# Patient Record
Sex: MALE | Race: ASIAN | NOT HISPANIC OR LATINO | URBAN - METROPOLITAN AREA
[De-identification: names, ages, dates, MRNs, and addresses within clinical notes are randomized per-mention and may not be internally consistent; named-entity substitution may affect disease eponyms.]

---

## 2018-06-26 ENCOUNTER — RECORDS - HEALTHEAST (OUTPATIENT)
Dept: GENERAL RADIOLOGY | Facility: CLINIC | Age: 53
End: 2018-06-26

## 2018-06-26 ENCOUNTER — COMMUNICATION - HEALTHEAST (OUTPATIENT)
Dept: TELEHEALTH | Facility: CLINIC | Age: 53
End: 2018-06-26

## 2018-06-26 ENCOUNTER — OFFICE VISIT - HEALTHEAST (OUTPATIENT)
Dept: FAMILY MEDICINE | Facility: CLINIC | Age: 53
End: 2018-06-26

## 2018-06-26 DIAGNOSIS — M25.50 MULTIPLE JOINT PAIN: ICD-10-CM

## 2018-06-26 DIAGNOSIS — F32.A DEPRESSION: ICD-10-CM

## 2018-06-26 DIAGNOSIS — M25.50 PAIN IN UNSPECIFIED JOINT: ICD-10-CM

## 2018-06-26 LAB
ERYTHROCYTE [SEDIMENTATION RATE] IN BLOOD BY WESTERGREN METHOD: 9 MM/HR (ref 0–15)
RHEUMATOID FACT SERPL-ACNC: <15 IU/ML (ref 0–30)
URATE SERPL-MCNC: 4.7 MG/DL (ref 3–8)

## 2018-06-26 ASSESSMENT — MIFFLIN-ST. JEOR: SCORE: 1290.32

## 2018-06-27 ENCOUNTER — AMBULATORY - HEALTHEAST (OUTPATIENT)
Dept: FAMILY MEDICINE | Facility: CLINIC | Age: 53
End: 2018-06-27

## 2018-06-28 LAB — ANA SER QL: 0.6 U

## 2018-07-10 ENCOUNTER — OFFICE VISIT - HEALTHEAST (OUTPATIENT)
Dept: FAMILY MEDICINE | Facility: CLINIC | Age: 53
End: 2018-07-10

## 2018-07-10 DIAGNOSIS — F32.A DEPRESSION: ICD-10-CM

## 2018-07-10 DIAGNOSIS — M25.562 LEFT KNEE PAIN: ICD-10-CM

## 2018-07-10 DIAGNOSIS — M25.50 MULTIPLE JOINT PAIN: ICD-10-CM

## 2018-07-24 ENCOUNTER — OFFICE VISIT - HEALTHEAST (OUTPATIENT)
Dept: PHYSICAL THERAPY | Facility: REHABILITATION | Age: 53
End: 2018-07-24

## 2018-07-24 DIAGNOSIS — M62.81 MUSCLE WEAKNESS (GENERALIZED): ICD-10-CM

## 2018-07-24 DIAGNOSIS — M54.16 LUMBAR RADICULOPATHY: ICD-10-CM

## 2018-07-24 DIAGNOSIS — M79.605 LEFT LEG PAIN: ICD-10-CM

## 2018-08-07 ENCOUNTER — OFFICE VISIT - HEALTHEAST (OUTPATIENT)
Dept: FAMILY MEDICINE | Facility: CLINIC | Age: 53
End: 2018-08-07

## 2018-08-07 DIAGNOSIS — M25.50 MULTIPLE JOINT PAIN: ICD-10-CM

## 2018-08-07 DIAGNOSIS — F32.A DEPRESSION: ICD-10-CM

## 2018-08-07 RX ORDER — DICLOFENAC SODIUM 75 MG/1
TABLET, DELAYED RELEASE ORAL
Qty: 40 TABLET | Refills: 1 | Status: SHIPPED | OUTPATIENT
Start: 2018-08-07

## 2018-08-09 ENCOUNTER — OFFICE VISIT - HEALTHEAST (OUTPATIENT)
Dept: PHYSICAL THERAPY | Facility: REHABILITATION | Age: 53
End: 2018-08-09

## 2018-08-09 DIAGNOSIS — M54.16 LUMBAR RADICULOPATHY: ICD-10-CM

## 2018-08-09 DIAGNOSIS — M79.605 LEFT LEG PAIN: ICD-10-CM

## 2018-08-09 DIAGNOSIS — M62.81 MUSCLE WEAKNESS (GENERALIZED): ICD-10-CM

## 2018-08-13 ENCOUNTER — AMBULATORY - HEALTHEAST (OUTPATIENT)
Dept: FAMILY MEDICINE | Facility: CLINIC | Age: 53
End: 2018-08-13

## 2018-08-15 ENCOUNTER — OFFICE VISIT - HEALTHEAST (OUTPATIENT)
Dept: PHYSICAL THERAPY | Facility: REHABILITATION | Age: 53
End: 2018-08-15

## 2018-08-15 DIAGNOSIS — M62.81 MUSCLE WEAKNESS (GENERALIZED): ICD-10-CM

## 2018-08-15 DIAGNOSIS — M54.16 LUMBAR RADICULOPATHY: ICD-10-CM

## 2018-08-15 DIAGNOSIS — M79.605 LEFT LEG PAIN: ICD-10-CM

## 2018-08-23 ENCOUNTER — OFFICE VISIT - HEALTHEAST (OUTPATIENT)
Dept: PHYSICAL THERAPY | Facility: REHABILITATION | Age: 53
End: 2018-08-23

## 2018-08-23 DIAGNOSIS — M54.16 LUMBAR RADICULOPATHY: ICD-10-CM

## 2018-08-23 DIAGNOSIS — M79.605 LEFT LEG PAIN: ICD-10-CM

## 2018-08-23 DIAGNOSIS — M62.81 MUSCLE WEAKNESS (GENERALIZED): ICD-10-CM

## 2018-10-04 ENCOUNTER — OFFICE VISIT - HEALTHEAST (OUTPATIENT)
Dept: FAMILY MEDICINE | Facility: CLINIC | Age: 53
End: 2018-10-04

## 2018-10-04 DIAGNOSIS — M77.12 LATERAL EPICONDYLITIS, LEFT ELBOW: ICD-10-CM

## 2018-10-04 DIAGNOSIS — Z12.11 SCREEN FOR COLON CANCER: ICD-10-CM

## 2018-10-08 ENCOUNTER — AMBULATORY - HEALTHEAST (OUTPATIENT)
Dept: FAMILY MEDICINE | Facility: CLINIC | Age: 53
End: 2018-10-08

## 2019-04-02 ENCOUNTER — OFFICE VISIT - HEALTHEAST (OUTPATIENT)
Dept: FAMILY MEDICINE | Facility: CLINIC | Age: 54
End: 2019-04-02

## 2019-04-02 ENCOUNTER — RECORDS - HEALTHEAST (OUTPATIENT)
Dept: GENERAL RADIOLOGY | Facility: CLINIC | Age: 54
End: 2019-04-02

## 2019-04-02 ENCOUNTER — COMMUNICATION - HEALTHEAST (OUTPATIENT)
Dept: FAMILY MEDICINE | Facility: CLINIC | Age: 54
End: 2019-04-02

## 2019-04-02 DIAGNOSIS — M25.50 MULTIPLE JOINT PAIN: ICD-10-CM

## 2019-04-02 DIAGNOSIS — M54.5 LOW BACK PAIN, UNSPECIFIED BACK PAIN LATERALITY, UNSPECIFIED CHRONICITY, WITH SCIATICA PRESENCE UNSPECIFIED: ICD-10-CM

## 2019-04-02 DIAGNOSIS — M54.16 RADICULOPATHY, LUMBAR REGION: ICD-10-CM

## 2019-04-02 DIAGNOSIS — M54.16 LUMBAR RADICULOPATHY: ICD-10-CM

## 2019-04-02 DIAGNOSIS — M54.50 LOW BACK PAIN: ICD-10-CM

## 2019-04-02 DIAGNOSIS — F32.A DEPRESSION: ICD-10-CM

## 2019-04-02 DIAGNOSIS — M77.12 LATERAL EPICONDYLITIS, LEFT ELBOW: ICD-10-CM

## 2019-04-02 LAB
ANION GAP SERPL CALCULATED.3IONS-SCNC: 9 MMOL/L (ref 5–18)
BUN SERPL-MCNC: 9 MG/DL (ref 8–22)
CALCIUM SERPL-MCNC: 9.3 MG/DL (ref 8.5–10.5)
CHLORIDE BLD-SCNC: 106 MMOL/L (ref 98–107)
CO2 SERPL-SCNC: 26 MMOL/L (ref 22–31)
CREAT SERPL-MCNC: 0.85 MG/DL (ref 0.7–1.3)
GFR SERPL CREATININE-BSD FRML MDRD: >60 ML/MIN/1.73M2
GLUCOSE BLD-MCNC: 95 MG/DL (ref 70–125)
POTASSIUM BLD-SCNC: 3.8 MMOL/L (ref 3.5–5)
SODIUM SERPL-SCNC: 141 MMOL/L (ref 136–145)

## 2019-04-02 RX ORDER — PREDNISONE 10 MG/1
TABLET ORAL
Qty: 40 TABLET | Refills: 0 | Status: SHIPPED | OUTPATIENT
Start: 2019-04-02

## 2019-04-02 ASSESSMENT — MIFFLIN-ST. JEOR: SCORE: 1317.53

## 2019-04-03 ENCOUNTER — COMMUNICATION - HEALTHEAST (OUTPATIENT)
Dept: FAMILY MEDICINE | Facility: CLINIC | Age: 54
End: 2019-04-03

## 2020-11-23 ENCOUNTER — RECORDS - HEALTHEAST (OUTPATIENT)
Dept: LAB | Facility: CLINIC | Age: 55
End: 2020-11-23

## 2020-11-23 LAB
ALBUMIN SERPL-MCNC: 4.1 G/DL (ref 3.5–5)
ALP SERPL-CCNC: 112 U/L (ref 45–120)
ALT SERPL W P-5'-P-CCNC: 23 U/L (ref 0–45)
ANION GAP SERPL CALCULATED.3IONS-SCNC: 10 MMOL/L (ref 5–18)
AST SERPL W P-5'-P-CCNC: 22 U/L (ref 0–40)
BILIRUB SERPL-MCNC: 0.9 MG/DL (ref 0–1)
BUN SERPL-MCNC: 12 MG/DL (ref 8–22)
CALCIUM SERPL-MCNC: 9.2 MG/DL (ref 8.5–10.5)
CHLORIDE BLD-SCNC: 104 MMOL/L (ref 98–107)
CHOLEST SERPL-MCNC: 232 MG/DL
CO2 SERPL-SCNC: 28 MMOL/L (ref 22–31)
CREAT SERPL-MCNC: 0.91 MG/DL (ref 0.7–1.3)
FASTING STATUS PATIENT QL REPORTED: ABNORMAL
GFR SERPL CREATININE-BSD FRML MDRD: >60 ML/MIN/1.73M2
GLUCOSE BLD-MCNC: 98 MG/DL (ref 70–125)
HDLC SERPL-MCNC: 49 MG/DL
LDLC SERPL CALC-MCNC: 161 MG/DL
POTASSIUM BLD-SCNC: 3.8 MMOL/L (ref 3.5–5)
PROT SERPL-MCNC: 7.8 G/DL (ref 6–8)
SODIUM SERPL-SCNC: 142 MMOL/L (ref 136–145)
TRIGL SERPL-MCNC: 112 MG/DL

## 2021-03-25 ENCOUNTER — RECORDS - HEALTHEAST (OUTPATIENT)
Dept: LAB | Facility: CLINIC | Age: 56
End: 2021-03-25

## 2021-03-25 LAB
ANION GAP SERPL CALCULATED.3IONS-SCNC: 9 MMOL/L (ref 5–18)
BUN SERPL-MCNC: 12 MG/DL (ref 8–22)
CALCIUM SERPL-MCNC: 8.9 MG/DL (ref 8.5–10.5)
CHLORIDE BLD-SCNC: 107 MMOL/L (ref 98–107)
CO2 SERPL-SCNC: 25 MMOL/L (ref 22–31)
CREAT SERPL-MCNC: 0.96 MG/DL (ref 0.7–1.3)
GFR SERPL CREATININE-BSD FRML MDRD: >60 ML/MIN/1.73M2
GLUCOSE BLD-MCNC: 70 MG/DL (ref 70–125)
POTASSIUM BLD-SCNC: 4.3 MMOL/L (ref 3.5–5)
SODIUM SERPL-SCNC: 141 MMOL/L (ref 136–145)

## 2021-04-22 ENCOUNTER — RECORDS - HEALTHEAST (OUTPATIENT)
Dept: LAB | Facility: CLINIC | Age: 56
End: 2021-04-22

## 2021-04-22 LAB
ANION GAP SERPL CALCULATED.3IONS-SCNC: 12 MMOL/L (ref 5–18)
BUN SERPL-MCNC: 15 MG/DL (ref 8–22)
CALCIUM SERPL-MCNC: 8.7 MG/DL (ref 8.5–10.5)
CHLORIDE BLD-SCNC: 108 MMOL/L (ref 98–107)
CO2 SERPL-SCNC: 22 MMOL/L (ref 22–31)
CREAT SERPL-MCNC: 0.81 MG/DL (ref 0.7–1.3)
GFR SERPL CREATININE-BSD FRML MDRD: >60 ML/MIN/1.73M2
GLUCOSE BLD-MCNC: 83 MG/DL (ref 70–125)
POTASSIUM BLD-SCNC: 4 MMOL/L (ref 3.5–5)
SODIUM SERPL-SCNC: 142 MMOL/L (ref 136–145)

## 2021-05-27 NOTE — PROGRESS NOTES
Subjective: The patient comes in to follow-up on a few things this 53-year-old male has a history of multiple joint pains in through his back elbow knees.  He has been on diclofenac last summer that seemed to help somewhat    He does not have any more now.    He states that his lower back is been bothering him the most he has pain in through the the left lumbar area into the buttocks and down the leg    Back in June 2018 he had labs including sed rate MARBELLA rheumatoid factor and uric acid which were all normal.    Patient does have underlying depression and is now seeing a psychiatrist in [place] he is not sure the name of the place but he sees a psychiatrist named [facility], and also sees a counselor there he is on medication.    In the past I had had him on mirtazapine he is not sure if this is a medicine he is on or something different.    Patient has no chest pain no shortness of breath.  Denies any rashes.    Continues have some pain in through the left elbow he has had some lateral epicondylitis there in the past we discussed proper lifting, avoiding dorsiflexion in the forearm.    Denies any swelling of any joints    Patient previously was referred for colonoscopy he did not want to do that he will think about doing [place] guard etc. we will discuss again when he follows up    I like to see him back for recheck in 2 weeks please see below    Tobacco status: He  reports that he has been smoking cigarettes.  he has never used smokeless tobacco.    Patient Active Problem List    Diagnosis Date Noted     Low back pain, unspecified back pain laterality, unspecified chronicity, with sciatica presence unspecified 04/02/2019     Depression 07/10/2018     Multiple joint pain 07/10/2018       Current Outpatient Medications   Medication Sig Dispense Refill     diclofenac (VOLTAREN) 75 MG EC tablet 1 po two times a day prn joint pain 40 tablet 1     predniSONE (DELTASONE) 10 mg tablet Take 40mg by mouth daily for 4 days, then  "30mg x 4 days, then 20mg x 4 days ,then 10mg x 4 days then stop. 40 tablet 0     No current facility-administered medications for this visit.        ROS:   10 point review of systems positive as outlined above otherwise negative        Objective:    /80   Pulse (!) 24   Temp 97.5  F (36.4  C) (Oral)   Resp (!) 66   Ht 5' 4\" (1.626 m)   Wt 126 lb (57.2 kg)   BMI 21.63 kg/m    Body mass index is 21.63 kg/m .  The following are part of a depression follow up plan for the patient:  under care of mental health team    PHQ 9 score was 21 today again question #9 came back at 1, but he has no plans regarding any suicide.    HEENT neck negative oropharynx clear eyes without scleral icterus    Lungs are clear no rales or rhonchi, heart was regular S1-S2.    Abdomen nontender.    Extremities without edema    He has pain in through the left lateral epicondyle he has pain along the joint line left knee    No effusion or swelling through any of the joints.  He complains of some achiness in his hands at times.    Lower back with paraspinal tenderness on the left pain radiating down the buttocks and down the leg laterally.  Positive straight leg raising at 80 degrees.    Labs from 6/26/2018 showed normal sed rate normal found a normal rheumatoid factor normal uric acid.    BMP level pending from today    X-ray of lumbar line ordered and pending from today    Results for orders placed or performed in visit on 06/26/18   Erythrocyte Sedimentation Rate   Result Value Ref Range    Sed Rate 9 0 - 15 mm/hr   Antinuclear Antibody (MARBELLA) Cascade   Result Value Ref Range    MARBELLA Screen Cascade 0.6 <=2.9 U   Rheumatoid Factor Quant   Result Value Ref Range    Rheumatoid Factor Quantitative <15.0 0 - 30 IU/mL   Uric Acid   Result Value Ref Range    Uric Acid 4.7 3.0 - 8.0 mg/dL       Assessment:  1. Multiple joint pain  Basic Metabolic Panel   2. Depression     3. Low back pain, unspecified back pain laterality, unspecified " chronicity, with sciatica presence unspecified  XR Lumbar Spine 2 or 3 VWS   4. Lumbar radiculopathy  predniSONE (DELTASONE) 10 mg tablet    XR Lumbar Spine 2 or 3 VWS   5. Lateral epicondylitis, left elbow       Multiple joint pain, will likely use diclofenac or some NSAID again I did check BMP.  He denies any stomach problems    Depression continue to see psychiatry and counseling.  He states he is going there within the next month    Left lumbar radiculopathy will treat with prednisone 40 mg a day tapering over 16-day.    Lateral epicondylitis left elbow, proper lifting technique discussed consider injection    Plan: As outlined above follow-up in 2 weeks he will be contacted regarding the blood work and x-ray if indicated prior to then.    This transcription uses voice recognition software, which may contain typographical errors.

## 2021-05-27 NOTE — TELEPHONE ENCOUNTER
----- Message from Jones Fuentes MD sent at 4/2/2019  5:37 PM CDT -----  Please contact this patient, let him know that the kidney function tests were normal so it is okay for him to continue to take the medications that we talked about.    The x-ray of his spine did show a lot of stool in the colon he may have some constipation.  Try to increase water 8-10 glasses a day as well as increase fruits and vegetables.    He should have an appointment to see me in 2 weeks

## 2021-05-27 NOTE — TELEPHONE ENCOUNTER
Spoke to patient and relayed message.   He replied with understanding of recommendation.  Appt is set for the 16th of April.  No further questions at this time.  Completing task.

## 2021-06-01 VITALS — BODY MASS INDEX: 20.49 KG/M2 | WEIGHT: 120 LBS | HEIGHT: 64 IN

## 2021-06-01 VITALS — WEIGHT: 119 LBS | BODY MASS INDEX: 20.43 KG/M2

## 2021-06-01 VITALS — BODY MASS INDEX: 20.86 KG/M2 | WEIGHT: 121.5 LBS

## 2021-06-02 VITALS — BODY MASS INDEX: 21.51 KG/M2 | HEIGHT: 64 IN | WEIGHT: 126 LBS

## 2021-06-16 PROBLEM — M54.50 LOW BACK PAIN: Status: ACTIVE | Noted: 2019-04-02

## 2021-06-16 PROBLEM — F32.A DEPRESSION: Status: ACTIVE | Noted: 2018-07-10

## 2021-06-16 PROBLEM — M25.50 MULTIPLE JOINT PAIN: Status: ACTIVE | Noted: 2018-07-10

## 2021-06-18 NOTE — PROGRESS NOTES
Subjective: This patient comes in the clinic for the first time is a 52-year-old male.  He came to [place] in  lived in [facility] until 2018 when he moved to [place]    Family history is uncertain his mother  at age 50 father  at age 70.  His mom had some digestive problems.    Patient denies any surgeries or hospitalizations.  He denies any significant medical problems.    On review of systems though he does have concerns regarding anxiety and depression some leg pains abdominal pain etc.    His main symptoms are in through his knees more on the left than the right he also complains of joint pain is elbows and wrists.  He does not have any swelling.    He describes a tingling or numbness in through the joint as well    He has also had depression symptoms and had a PHQ 9 score of 21.  No suicidal thoughts or plans.    After discussion I did treat him with some mirtazapine he has had a lot of trouble sleeping also ibuprofen for the joint pain will check some lab tests including sed rate uric acid fan a rheumatoid factor and also get a chest x-ray of his left knee    He smokes half pack per day, he does not drink alcohol.  Denies any mental health issues in the family        Tobacco status: He  reports that he has been smoking Cigarettes.  He has never used smokeless tobacco.    There are no active problems to display for this patient.      Current Outpatient Prescriptions   Medication Sig Dispense Refill     ibuprofen (ADVIL,MOTRIN) 400 MG tablet Take 1 tablet (400 mg total) by mouth every 8 (eight) hours as needed for pain. 40 tablet 2     mirtazapine (REMERON) 30 MG tablet Take 1 tablet (30 mg total) by mouth at bedtime. 30 tablet 2     No current facility-administered medications for this visit.        ROS: 10 point review of systems negative other than as outlined above  Objective:    /70 (Patient Site: Right Arm, Patient Position: Sitting, Cuff Size: Adult Regular)  Pulse  "81  Resp 16  Ht 5' 4\" (1.626 m)  Wt 120 lb (54.4 kg)  BMI 20.6 kg/m2  Body mass index is 20.6 kg/(m^2).      General appearance quiet    Vital signs are stable    HEENT neck negative oropharynx clear    Lungs clear no rales or rhonchi heart regular rate in the 80s    Abdomen nontender no guarding rebound    No focal weakness or numbness    Extremities without edema    Skin without rash or ulceration.    Normal sensation to light touch.    No focal weakness    Joints without redness warmth or swelling x-ray left knee was obtained and appeared normal.    Labs pending include sed rate uric acid and rheumatoid factor    PHQ 9 score as outlined was 21    No results found for this or any previous visit.    Assessment:  1. Multiple joint pain  XR Knee Left 1 or 2 VWS    Erythrocyte Sedimentation Rate    Antinuclear Antibody (MARBELLA) Cascade    Rheumatoid Factor Quant    Uric Acid    ibuprofen (ADVIL,MOTRIN) 400 MG tablet   2. Depression  mirtazapine (REMERON) 30 MG tablet     Multiple joint pain with depression a lot of somatic complaints.    Poor sleep    Start on mirtazapine 30 mg at bedtime    Use ibuprofen 400 mg twice daily    Await labs see the patient back for recheck in 2 weeks    Plan: As discussed above    This transcription uses voice recognition software, which may contain typographical errors.  "

## 2021-06-19 NOTE — PROGRESS NOTES
[facility]   Knee Initial Evaluation    Patient Name: Naida Givens  Date of evaluation: 7/24/2018  Referral Diagnosis: Left knee pain  Referring provider: Jones Fuentes MD  Visit Diagnosis:     ICD-10-CM    1. Lumbar radiculopathy M54.16    2. Left leg pain M79.605    3. Muscle weakness (generalized) M62.81        Assessment:      Impairments in  pain, posture, ROM, joint mobility, strength  The POC is dynamic and will be modified on an ongoing basis.  Barriers to achieving goals as noted in the assessment section may affect outcome.  Prognosis to achieve goals is  fair   Pt. is appropriate for skilled PT intervention as outlined in the Plan of Care (POC).  Pt. is a good candidate for skilled PT services to improve pain levels and function.     Naida Givens is a 52 y.o. male who presents to therapy today with chief complaints of left > right leg pain with weakness and falling that started more than 2 years ago and is progressively getting worse. Patient reports he has a hx of back pain but not not currently having the pain in the back right now. He has positive findings with lumbar flexion increasing his same pain and with repetition the pain increased and his knee buckled some, he has positive neural tension with SLR and slump on the left side with increase tightness of the left hamstrings. Patient has negative testing with the knee joint itself. He will benefit from skilled PT intervention to increase ROM, decrease pain and neural tension and improved functional mobility. .    Goals:  Pt. will be independent with home exercise program in : 6 weeks  Pt. will show improved balance for safer : ambulation;standing;for dressing/grooming;for community ambulation;for exercise/recreation;in 12 weeks  Pt. will be able to walk : 20 minutes;30 minutes;with less pain;with less difficulty;for household mobility;for community mobility;for exercise/recreation;in 12 weeks  Patient will ascend / descend:  stairs;step;curb;with less pain;with less difficulty;in 6 weeks  No Data Recorded    Goals and plan of care were set in collaboration with the patient.    Patient's expectations/goals are realistic.    Barriers to Learning or Achieving Goals:  Chronicity of the problem.  Co-morbidities or other medical factors.  depression  Language barriers. Professional Hmong  present throughout the session        Patient educated on and demonstrated understanding of nature of impairment, plan of care, patient role and HEP. Patient compliant with PT and prognosis is good. Patient would benefit from skilled PT to progress and improve range of motion, flexibility and tissue extensibility, joint mobility and pain.     Plan / Patient Instructions:      Plan of Care:   Authorization / Certification Start Date: 07/24/18  Authorization / Certification End Date: 10/16/18  Authorization / Certification Number of Visits: 12  Communication with: Referral Source  Patient Related Instruction: Nature of Condition;Treatment plan and rationale;Self Care instruction;Basis of treatment;Body mechanics;Posture;Precautions;Next steps;Expected outcome  Times per Week: 1-2  Number of Visits: up to 12 sessions  Therapeutic Exercise: ROM;Stretching;Strengthening  Neuromuscular Reeducation: kinesio tape;posture;postural restoration;core;balance/proprioception;TNE  Manual Therapy: soft tissue mobilization;myofascial release;joint mobilization;muscle energy;strain counterstrain  Modalities: electrical stimulation;TENS;cold pack;hot pack;traction    Plan for next visit: manual nerve glides, lumbar mobs as appropriate, dynamic stretching and progress quad, hip, core and HS strengthening exercises      Subjective:        Social information:   Living Situation:single family home, lives with others  and no stairs to do at the house   Occupation:retired and unemployed   Work Status:NA   Equipment Available: None    History of Present Illness:    Naida is  a 52 y.o. male who presents to therapy today with complaints of left knee pain that has been progressively getting worse since 2016. He states the legs become weak and painful and it causes him to fall, he states he has fallen a lot in the last year. He describes the pain as a puling of the muscles in the back of the legs, the knees will buckle due to this pain and weakness and he falls. This happens once or twice per day per the patient report. When he lays down for a longer period of time then it is harder to get up and move due to stiffness in his whole body. He is taking ibuprofen and it helps x 2 hours. He spends a lot of the day moving around to prevent the stiffness in the legs. When he is up and moving around     Pain Ratin  Pain rating at best: 3  Pain rating at worst: 7  Pain description:aching, pain, sharp, weakness and pulling     Functional limitations are described as occurring with:   ascending and descending stairs or curbs  balance  bending  standing >10 min  walking >10 min     Patient reports benefit from:  anti-inflammatory, pain medication       Objective:      Note: Items left blank indicates the item was not performed or not indicated at the time of the evaluation.    Knee Examination  1. Lumbar radiculopathy     2. Left leg pain     3. 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     Positive SLR in supine at 30 deg, ankle DF increase pain and tension felt on left side     Hip Clearing: Does not provoke symptoms    Knee ROM Within normal limits unless otherwise indicated     Date:  7/24/2018    AROM in degrees  Right   Left  Right   Left  Right   Left       Knee Flexion  (130 )   140 140                     Knee Extension  (0 )   0   0                 PROM in degrees  Right   Left  Right   Left  Right   Left       Knee Flexion  (130 )                         Knee Extension  (0 )                       LE Strength    Within normal limits unless otherwise indicated               Date:  7/24/2018   Strength (MMT/5)  Right   Left  Right   Left       Hip Flexion   5- pain 4 pain               Hip Abduction      In sitting painful             Hip Adduction      Painful posterior leg             Hip Extension                   Hip Internal Rotation                   Hip External Rotation                   Knee Extension      Painful posterior knee             Knee Flexion      Painful posterior knee              Ankle Dorsiflexion      Unable to heel walk             Ankle Plantarflexion   Able to do 3 reps SL heel raises before knee buckled    Unable to toe walk  Was able to do 2 reps SL heel raises before knee buckled            Flexibility:  Moderate + decrease in left HS in supine 90/90 testing, min-mod on the right side     Palpation:  Non tender throughout posterior knee, patella and joint lines, non tender in low back as well     Knee Special Tests (+/-):  7/24/2018    Knee OA Cluster   Right   Left   Ligament Tests   Right   Left    1. > 49 y/o      +     Lachman      -    2. Stiffness > 30 min.      -     Anterior Drawer          3. Crepitus      -     Posterior Drawer      -    4. Bony tenderness      -     Posterior Sag          5. Bone enlargement           Valgus Stress       Tender     6. No warmth to the touch           Varus Stress      tender     Meniscal Tests   Right   Left    Other   Right    Left       Juliana's      -     Ely's             Joint line tenderness      -     Ara             Thessaly Thomas Apley's                        Treatment Today   7/24/2018  TREATMENT MINUTES COMMENTS   Evaluation 35    Self-care/ Home management     Manual therapy     Neuromuscular Re-education     Therapeutic Activity     Therapeutic Exercises 20 Discussed POC and pathology and how clinical finding suggest more neural tension involvement than knee joint pathology.   Initiated HEP with handouts given  - supine and seated slump tensioner glides  - seated and supine fig 4 modified stretching    Gait training     Modality__________________                Total 55    Blank areas are intentional and mean the treatment did not include these items.     PT Evaluation Code: (Please list factors)  Patient History/Comorbidities: as above   Examination: as above   Clinical Presentation: stable   Clinical Decision Making: low     Patient History/  Comorbidities Examination  (body structures and functions, activity limitations, and/or participation restrictions) Clinical Presentation Clinical Decision Making (Complexity)   No documented Comorbidities or personal factors 1-2 Elements Stable and/or uncomplicated Low   1-2 documented comorbidities or personal factor 3 Elements Evolving clinical presentation with changing characteristics Moderate   3-4 documented comorbidities or personal factors 4 or more Unstable and unpredictable High     mOayra Gilliland, PT, DPT   7/24/2018  10:36 AM

## 2021-06-19 NOTE — PROGRESS NOTES
Subjective: This patient comes in for follow-up.  Patient had been seen for the first time on 6/26/2018 were unable to get any old records    He has depression PHQ 9 was 21 he started mirtazapine is on 30 mg at bedtime it has only been a couple weeks but he is sleeping better PHQ 9 was checked today came down to 17.  He is not sure if his depression is significant only better but he will monitor.  Stay on the same dose    He has had multiple joints that have been bothersome for him elbow wrist knee his exam was negative.  The labs showed normal uric acid, sed rate, Elyse, rheumatoid factor    Again x-ray of the knee which was normal.  It is his left knee that bothers him most the other joints seem to be doing a little better he is on ibuprofen 400 mg twice daily    Pain is posteriorly through the left knee and along the lateral joint line.    Please see below and him go to physical therapy    Tobacco status: He  reports that he has been smoking Cigarettes.  He has never used smokeless tobacco.    Patient Active Problem List    Diagnosis Date Noted     Depression 07/10/2018     Multiple joint pain 07/10/2018       Current Outpatient Prescriptions   Medication Sig Dispense Refill     ibuprofen (ADVIL,MOTRIN) 400 MG tablet Take 1 tablet (400 mg total) by mouth every 8 (eight) hours as needed for pain. 40 tablet 2     mirtazapine (REMERON) 30 MG tablet Take 1 tablet (30 mg total) by mouth at bedtime. 30 tablet 2     No current facility-administered medications for this visit.        ROS:   Review of systems negative other than as outlined above    Objective:    /72 (Patient Site: Left Arm, Patient Position: Sitting, Cuff Size: Adult Regular)  Pulse 80 Comment: Irregular  Temp 97.3  F (36.3  C) (Oral)   Resp 12  Wt 119 lb (54 kg)  BMI 20.43 kg/m2  Body mass index is 20.43 kg/(m^2).  The following are part of a depression follow up plan for the patient:  under care of mental health team    General appearance no  acute distress vital signs were stable    Left knee with tenderness in the area outlined negative Juliana negative Lockman negative patellar apprehension no effusion x-ray as discussed.    Labs from 6/26/2018 normal below    Results for orders placed or performed in visit on 06/26/18   Erythrocyte Sedimentation Rate   Result Value Ref Range    Sed Rate 9 0 - 15 mm/hr   Antinuclear Antibody (MARBELLA) Cascade   Result Value Ref Range    MARBELLA Screen Cascade 0.6 <=2.9 U   Rheumatoid Factor Quant   Result Value Ref Range    Rheumatoid Factor Quantitative <15.0 0 - 30 IU/mL   Uric Acid   Result Value Ref Range    Uric Acid 4.7 3.0 - 8.0 mg/dL       Assessment:  1. Multiple joint pain     2. Depression     3. Left knee pain  Ambulatory referral to PT/OT     Multiple joint pain but more specifically left knee now has had some slight improvement with ibuprofen does not seem inflammatory.  We will have him go to physical therapy recheck in 4 weeks    Depression slight improvement sleeping better continue mirtazapine recheck in 4 weeks.    Plan: Total time with patient was 15 minutes over 10 minutes spent in counseling reviewing medications is musculoskeletal symptoms and depression symptoms and coordination of care    This transcription uses voice recognition software, which may contain typographical errors.

## 2021-06-19 NOTE — PROGRESS NOTES
[facility] Daily Progress     Patient Name: Naida Givens  Date: 8/9/2018  Visit #: 2  PTA visit #:  1  Referral Diagnosis: Left knee pain  Referring provider: Jones Fuentes MD  Visit Diagnosis:     ICD-10-CM    1. Lumbar radiculopathy M54.16    2. Left leg pain M79.605    3. Muscle weakness (generalized) M62.81      Naida Givens is a 52 y.o. male who presents to therapy today with chief complaints of left > right leg pain with weakness and falling that started more than 2 years ago and is progressively getting worse. Patient reports he has a hx of back pain but not not currently having the pain in the back right now. He has positive findings with lumbar flexion increasing his same pain and with repetition the pain increased and his knee buckled some, he has positive neural tension with SLR and slump on the left side with increase tightness of the left hamstrings. Patient has negative testing with the knee joint itself. He will benefit from skilled PT intervention to increase ROM, decrease pain and neural tension and improved functional mobility. .       Assessment:   L elbow pain may be due to poor exercise technique (was using a towel to lift knees toward chest instead of seated nerve glide)  Pain levels decreased significantly with corrected  nerve gliding exercises  Pelvic instability significant  HEP remains challenging for pt  Patient demonstrates understanding/independence with home program.  Patient is benefitting from skilled physical therapy and is making steady progress toward functional goals.  Patient is appropriate to continue with skilled physical therapy intervention, as indicated by initial plan of care.    Goal Status:  Pt. will be independent with home exercise program in : 6 weeks  Pt. will show improved balance for safer : ambulation;standing;for dressing/grooming;for community ambulation;for exercise/recreation;in 12 weeks  Pt. will be able to walk : 20 minutes;30 minutes;with less  "pain;with less difficulty;for household mobility;for community mobility;for exercise/recreation;in 12 weeks  Patient will ascend / descend: stairs;step;curb;with less pain;with less difficulty;in 6 weeks  No Data Recorded    Plan / Patient Education:   Plan for next visit: manual nerve glides, lumbar mobs as appropriate, dynamic stretching and progress quad, hip, core and HS strengthening exercises   Continue with initial plan of care.  Progress with home program as tolerated.    Subjective:     Pain Rating: 3, L LE> R LE  Pt reports he walks a mile every day  L arm is also sore today, especially the elbow    Objective:     Pt in clinic from 9:55-10:30 am  Referral Diagnosis: Left knee pain        Exercises:  Exercise #1: Slump slider x 15 reps 80% vc  Comment #1: supine nerve glides x 15 reps 80% VC  Exercise #2: Piriformis stretch 30\" x 2 B 40% vc  Comment #2: Bridge with adductor roll, 5\" hold x 8  Exercise #3: clamshell  Comment #3: x 10 reps, increase to fatigue B  Treatment Today     TREATMENT MINUTES COMMENTS   Evaluation     Self-care/ Home management     Manual therapy     Neuromuscular Re-education     Therapeutic Activity     Therapeutic Exercises 35 Extensive review of nerve glides and stretching. Pt has significant pain relief after performing them correctly,Added bridge and clamshell to HEP.    Gait training     Modality__________________                Total 35    Blank areas are intentional and mean the treatment did not include these items.       Alberta Damon,IMELDAT  8/9/2018    "

## 2021-06-19 NOTE — PROGRESS NOTES
Subjective: This patient comes in for follow-up.  Patient again has multiple pains throughout his joints.  Previously was more localized in his knee and his lower back.    He states that it hurts all over now.    Patient had tried some ibuprofen but that causes some GI symptoms.  Her graph patient has gone to physical therapy for the back and knee, he has scheduled once a week over the next 3 weeks also.    Patient has major depression and I think there is some ongoing somatic overlay with achiness in multiple symptoms.    His PHQ 9 score was 21 I started him on mirtazapine 30 mg at bedtime he is sleeping better PHQ 9 score following that treatment came down to 17.  Today the PHQ 9 score was at 18    He sees a counselor/psychologist.  At psych rehab.    I think he would benefit from seeing a psychiatrist to evaluate for additional medications question Cymbalta etc.  He was interested in doing that so I gave him a referral.    We talked about health maintenance, refuses colonoscopy refused immunization update    Tobacco status: He  reports that he has been smoking Cigarettes.  He has never used smokeless tobacco.    Patient Active Problem List    Diagnosis Date Noted     Depression 07/10/2018     Multiple joint pain 07/10/2018       Current Outpatient Prescriptions   Medication Sig Dispense Refill     diclofenac (VOLTAREN) 75 MG EC tablet 1 po two times a day prn joint pain 40 tablet 1     mirtazapine (REMERON) 30 MG tablet Take 1 tablet (30 mg total) by mouth at bedtime. 30 tablet 2     No current facility-administered medications for this visit.        ROS:   10 point review of systems negative other than as outlined above    Objective:    /80 (Patient Site: Left Arm, Patient Position: Sitting, Cuff Size: Adult Regular)  Pulse 80  Temp 97.2  F (36.2  C) (Oral)   Resp 14  Wt 119 lb (54 kg)  SpO2 94%  BMI 20.43 kg/m2  Body mass index is 20.43 kg/(m^2).  The following are part of a depression follow up  "plan for the patient:  under care of mental health team    General appearance tired    Vital signs were stable.    HEENT complains of pain in through the neck some tenderness along the paraspinal muscles complains of lower back pain less leg pain in through the left leg now.  Her graph his knee x-ray was negative.    There is no effusion.  States that it hurts \"all over \"has elbow pain and wrist pain    There is no redness warmth or swelling.    Previous labs from June 26 all negative regarding sed rate MARBELLA rheumatoid factor uric acid.    PHQ 9 score today was 18.  Question #9 had a one but denied any plans of hurting himself    Results for orders placed or performed in visit on 06/26/18   Erythrocyte Sedimentation Rate   Result Value Ref Range    Sed Rate 9 0 - 15 mm/hr   Antinuclear Antibody (MARBELLA) Cascade   Result Value Ref Range    MARBELLA Screen Cascade 0.6 <=2.9 U   Rheumatoid Factor Quant   Result Value Ref Range    Rheumatoid Factor Quantitative <15.0 0 - 30 IU/mL   Uric Acid   Result Value Ref Range    Uric Acid 4.7 3.0 - 8.0 mg/dL       Assessment:  1. Depression  Ambulatory referral to Psychiatry   2. Multiple joint pain  diclofenac (VOLTAREN) 75 MG EC tablet     Ongoing depression with multiple joint pains.    Continue therapy will change over to diclofenac 150 mg twice daily    Encouraged ongoing counseling    Referral to psychiatry    Total time with patient 25 minutes over 20 minutes spent in counseling regarding his multiple symptoms, discussion on medications, discussion on psychiatric referral, coordination of care    Plan: As outlined above    This transcription uses voice recognition software, which may contain typographical errors.  "

## 2021-06-19 NOTE — PROGRESS NOTES
[facility] Daily Progress     Patient Name: Naida Givens  Date: 8/15/2018  Visit #: 3  PTA visit #:    Referral Diagnosis: Left knee pain  Referring provider: Jones Fuentes MD  Visit Diagnosis:     ICD-10-CM    1. Lumbar radiculopathy M54.16    2. Left leg pain M79.605    3. Muscle weakness (generalized) M62.81      Naida Givens is a 52 y.o. male who presents to therapy today with chief complaints of left > right leg pain with weakness and falling that started more than 2 years ago and is progressively getting worse. Patient reports he has a hx of back pain but not not currently having the pain in the back right now. He has positive findings with lumbar flexion increasing his same pain and with repetition the pain increased and his knee buckled some, he has positive neural tension with SLR and slump on the left side with increase tightness of the left hamstrings. Patient has negative testing with the knee joint itself. He will benefit from skilled PT intervention to increase ROM, decrease pain and neural tension and improved functional mobility. .       Assessment:     Pelvic instability significant  HEP remains challenging for pt  Patient demonstrates understanding/independence with home program.  Patient is benefitting from skilled physical therapy and is making steady progress toward functional goals.  Patient is appropriate to continue with skilled physical therapy intervention, as indicated by initial plan of care.    Goal Status:  Pt. will be independent with home exercise program in : 6 weeks  Pt. will show improved balance for safer : ambulation;standing;for dressing/grooming;for community ambulation;for exercise/recreation;in 12 weeks  Pt. will be able to walk : 20 minutes;30 minutes;with less pain;with less difficulty;for household mobility;for community mobility;for exercise/recreation;in 12 weeks  Patient will ascend / descend: stairs;step;curb;with less pain;with less difficulty;in 6  weeks  No Data Recorded    Plan / Patient Education:   Plan for next visit: manual nerve glides, lumbar mobs as appropriate, dynamic stretching and progress quad, hip, core and HS strengthening exercises   Continue with initial plan of care.  Progress with home program as tolerated.    Subjective:     Pain Rating: 3  Patient reports the pain is getting better. When he walks for a longer period of time then the pain will get better. But when he sleeps or sits more than 30 minutest then it will get stiff in the legs       Objective:         HEP:  Supine sciatic sliders  Seated slump tensioners  Piriformis stretching  Clam shells added band today   Bridges added band hip abduction today  SLR - added today   Heel raises - added today     Treatment Today     TREATMENT MINUTES COMMENTS   Evaluation     Self-care/ Home management     Manual therapy     Neuromuscular Re-education     Therapeutic Activity     Therapeutic Exercises 28 Bike WL 2 x 3 min   - SLR supine 2 x 10 reps each side - added to HEP cues to quad sets and slow down as needed  - bridges with abduction L2 band 2 x 10 reps added 5 sec hold on second set   - bridges with adduction pillow squeeze x 10 reps   - clam shells added L2 band x 12 reps each side   Wall squats x 5 reps with 10 sec hold   Heel raises off edge of step x 10 reps    Gait training     Modality__________________                Total 28    Blank areas are intentional and mean the treatment did not include these items.       Omayra Gilliland, PT, DPT  8/15/2018

## 2021-06-20 NOTE — PROGRESS NOTES
[facility] Daily Progress/Discharge Summary      Patient Name: Naida Givens  Date: 8/23/2018  Visit #: 4  PTA visit #:    Referral Diagnosis: Left knee pain  Referring provider: Jones Fuentes MD  Visit Diagnosis:     ICD-10-CM    1. Lumbar radiculopathy M54.16    2. Left leg pain M79.605    3. Muscle weakness (generalized) M62.81      Naida Givesn is a 52 y.o. male who presents to therapy today with chief complaints of left > right leg pain with weakness and falling that started more than 2 years ago and is progressively getting worse. Patient reports he has a hx of back pain but not not currently having the pain in the back right now. He has positive findings with lumbar flexion increasing his same pain and with repetition the pain increased and his knee buckled some, he has positive neural tension with SLR and slump on the left side with increase tightness of the left hamstrings. Patient has negative testing with the knee joint itself. He will benefit from skilled PT intervention to increase ROM, decrease pain and neural tension and improved functional mobility. .       Assessment:      Patient is making good progress overall, tolerating increase in exercise and able to walk more and more with less pain, at this time he will trial at home x 30days with I HEP. If he does not return to clinic he will then be discharged.   HEP/POC compliance is  good .  Patient demonstrates understanding/independence with home program.    Goal Status:  Pt. will be independent with home exercise program in : 6 weeks MET  Pt. will show improved balance for safer : ambulation;standing;for dressing/grooming;for community ambulation;for exercise/recreation;in 12 weeks Progressing  Pt. will be able to walk : 20 minutes;30 minutes;with less pain;with less difficulty;for household mobility;for community mobility;for exercise/recreation;in 12 weeks Progressing   Patient will ascend / descend: stairs;step;curb;with less pain;with  less difficulty;in 6 weeks Progressing    Plan / Patient Education:   Plan for next visit: trial at home x 30 days, DC if not return  Continue with initial plan of care.  Progress with home program as tolerated.    Subjective:     Pain Rating: 3  Patient states he is getting better and the exercises are very helpful. He feels if he keeps exercises then he will do well. When he his not moving is when he has more pain. Pain is anterior and posterior knees and low back.    Objective:     HEP:  Supine sciatic sliders  Seated slump tensioners  Piriformis stretching  Clam shells added band today   Bridges added band hip abduction today  SLR   Heel raises  Added wall squats today   Added eccentric step downs today     Treatment Today   8/23/2018  TREATMENT MINUTES COMMENTS   Evaluation     Self-care/ Home management     Manual therapy 10 Long leg distraction 30-60 sec holds with oscillations x 5 reps, manual nerve glides, HS and SKTC stretching    Neuromuscular Re-education     Therapeutic Activity     Therapeutic Exercises 20 Bike WL 2 x 3 min   - SLR supine x 15 reps each side - added to HEP cues to quad sets   - bridges with abduction and - bridges with adduction pillow squeeze reviewed today   - TA set with alternating LE extension x 10 reps each side    Wall squats x 10 reps with 10 sec hold   Heel raises off edge of step x 10 reps    Gait training     Modality__________________                Total 30    Blank areas are intentional and mean the treatment did not include these items.       Omayra Gilliland, PT, DPT  8/23/2018

## 2021-06-20 NOTE — PROGRESS NOTES
Subjective: Patient comes in for evaluation this patient's been on diclofenac for joint pains.  Comes in for more localized symptoms.    Patient is due for colonoscopy and referral was given.    Patient's had pain in through the left elbow since the lateral epicondyle please see below we discussed lifting techniques avoiding dorsiflexion lifting.    Patient continues to smoke we discussed quitting.    Previous labs showed normal sed rate negative found a rheumatoid factor and normal uric acid.    Tobacco status: He  reports that he has been smoking Cigarettes.  He has never used smokeless tobacco.    Patient Active Problem List    Diagnosis Date Noted     Depression 07/10/2018     Multiple joint pain 07/10/2018       Current Outpatient Prescriptions   Medication Sig Dispense Refill     diclofenac (VOLTAREN) 75 MG EC tablet 1 po two times a day prn joint pain 40 tablet 1     mirtazapine (REMERON) 30 MG tablet Take 1 tablet (30 mg total) by mouth at bedtime. 30 tablet 2     No current facility-administered medications for this visit.        ROS:   10 point review of systems negative other than as outlined above    Objective:    /84 (Patient Site: Right Arm, Patient Position: Sitting, Cuff Size: Adult Small)  Pulse 80  Temp 98  F (36.7  C) (Oral)   Resp 14  Wt 121 lb 8 oz (55.1 kg)  SpO2 95%  BMI 20.86 kg/m2  Body mass index is 20.86 kg/(m^2).      General appearance no acute distress    Vital signs were stable.    Lungs are clear throughout no rales or rhonchi heart was regular S1-S2    Skin is normal no rashes no synovial thickening    He has tenderness at the left elbow lateral epicondyle pain with dorsiflexion against pressure.    No swelling full range of motion    Results for orders placed or performed in visit on 06/26/18   Erythrocyte Sedimentation Rate   Result Value Ref Range    Sed Rate 9 0 - 15 mm/hr   Antinuclear Antibody (MARBELLA) Cascade   Result Value Ref Range    MARBELLA Screen Cascade 0.6 <=2.9 U    Rheumatoid Factor Quant   Result Value Ref Range    Rheumatoid Factor Quantitative <15.0 0 - 30 IU/mL   Uric Acid   Result Value Ref Range    Uric Acid 4.7 3.0 - 8.0 mg/dL       Assessment:  1. Lateral epicondylitis, left elbow     2. Screen for colon cancer  Ambulatory referral for Colonoscopy     Left elbow lateral epicondylitis use diclofenac.  Lifting technique discussed, limit use regarding dorsiflexion    Screen for colon cancer  Plan: As outlined    This transcription uses voice recognition software, which may contain typographical errors.

## 2023-06-14 ENCOUNTER — MEDICAL CORRESPONDENCE (OUTPATIENT)
Dept: HEALTH INFORMATION MANAGEMENT | Facility: CLINIC | Age: 58
End: 2023-06-14

## 2023-06-16 ENCOUNTER — MEDICAL CORRESPONDENCE (OUTPATIENT)
Dept: HEALTH INFORMATION MANAGEMENT | Facility: CLINIC | Age: 58
End: 2023-06-16